# Patient Record
Sex: MALE | Race: AMERICAN INDIAN OR ALASKA NATIVE
[De-identification: names, ages, dates, MRNs, and addresses within clinical notes are randomized per-mention and may not be internally consistent; named-entity substitution may affect disease eponyms.]

---

## 2019-03-28 ENCOUNTER — HOSPITAL ENCOUNTER (EMERGENCY)
Dept: HOSPITAL 14 - H.ER | Age: 44
Discharge: HOME | End: 2019-03-28
Payer: COMMERCIAL

## 2019-03-28 VITALS
SYSTOLIC BLOOD PRESSURE: 147 MMHG | HEART RATE: 96 BPM | DIASTOLIC BLOOD PRESSURE: 82 MMHG | TEMPERATURE: 98.3 F | RESPIRATION RATE: 16 BRPM | OXYGEN SATURATION: 98 %
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
## 2019-03-28 DIAGNOSIS — S61.213A: Primary | ICD-10-CM
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
## 2019-03-28 NOTE — ED PDOC
Upper Extremity Pain/Injury


Time Seen by Provider: 03/28/19 14:30


Chief Complaint (Nursing): Finger,Hand,&Wrist


Chief Complaint (Provider): Finger Laceration


History Per: Patient


History/Exam Limitations: no limitations


Onset/Duration Of Symptoms: Mins (x30 mins pta)


Current Symptoms Are (Timing): Better


Additional Complaint(s): 


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

of a left hand injury. Patient reports that about thirty minutes prior to 

arrival while cleaning, he cut his left middle finger on a piece of metal, and 

sent for evaluation. Prior to arriving, he states he cleaned the wound and 

controlled bleeding. Denies numbness, tingling, other injury, decreased ROM, 

decreased sensation, and anti-coagulant use.





Right hand dominant 


Tetanus not up to date


PMD: none provided





Past Medical History


Reviewed: Historical Data, Nursing Documentation, Vital Signs


Vital Signs: 





                                Last Vital Signs











Temp  98.3 F   03/28/19 14:17


 


Pulse  96 H  03/28/19 14:17


 


Resp  16   03/28/19 14:17


 


BP  147/82   03/28/19 14:17


 


Pulse Ox  98   03/28/19 14:17














- Medical History


PMH: No Chronic Diseases





- Surgical History


Surgical History: No Surg Hx





- Family History


Family History: States: Unknown Family Hx





- Social History


Current smoker - smoking cessation education provided: No


Alcohol: None


Drugs: Denies





- Allergies


Allergies/Adverse Reactions: 


                                    Allergies











Allergy/AdvReac Type Severity Reaction Status Date / Time


 


No Known Allergies Allergy   Verified 03/28/19 14:17














Review of Systems


ROS Statement: Except As Marked, All Systems Reviewed And Found Negative


Skin: Positive for: Other (laceration to left middle finger)


Neurological: Negative for: Numbness (or tingling of left hand or fingers)





Physical Exam





- Reviewed


Nursing Documentation Reviewed: Yes


Vital Signs Reviewed: Yes





- Physical Exam


Comments: 


GENERAL APPEARANCE: Patient is awake, alert, oriented x 3, in no acute distress.




SKIN:  Warm, dry; (-) cyanosis.


CHEST AND RESPIRATORY: (-) chest wall tenderness. Lungs: (-) rales, (-) rhonchi,

(-) wheezes; breath sounds equal bilaterally.


HEART AND CARDIOVASCULAR: (-) irregularity; (-) murmur, (-) gallop.


LEFT HAND: 2+ radial pulse, capillary refill less than 2 seconds. (+) 1cm very 

superficial linear laceration to distal palmar aspect of left third digit, (-) 

active bleeding. Sensation intact. (+) full ROM actively of all digits of left 

hand.


NEURO AND PSYCH: Mental status as above.





- ECG


O2 Sat by Pulse Oximetry: 98 (RA)


Pulse Ox Interpretation: Normal





Medical Decision Making


Medical Decision Making: 


Time: 1434


Initial Impression: wound care


Initial Plan:


--Patient's wound does not indicate suture repair. Wound covered with bacitracin

and band-aid. Return parameters discussed and patient verbalized understanding.


--Tetanus booster


Discussed diagnosis, treatment, wound care, return precautions and f/u with pt 

who is understanding, in agreement and stable for dc





 

--------------------------------------------------------------------------------


-----------------


Scribe Attestation:


Documented by Christa Christian, acting as a scribe for Manoj Flower PA-C.


Provider Scribe Attestation:


All medical record entries made by the Scribe were at my direction and 

personally dictated by me. I have reviewed the chart and agree that the record 

accurately reflects my personal performance of the history, physical exam, 

medical decision making, and the department course for this patient. I have also

personally directed, reviewed, and agree with the discharge instructions and 

disposition.





Disposition





- Clinical Impression


Clinical Impression: 


 Cut of finger








- Patient ED Disposition


Is Patient to be Admitted: No


Counseled Patient/Family Regarding: Studies Performed, Diagnosis, Need For 

Followup





- Disposition


Referrals: 


workers, comp [Other]


Disposition: Routine/Home


Disposition Time: 15:10


Condition: STABLE


Additional Instructions: 


Return to ED for new or worsening symptoms, fever >100.4, increase redness or 

swelling, foul odor or drainage from wound. Follow up with workers comp/employee

 health. Keep wound clean, dry and covered. Apply neosporin/bacitracin 1-2 times

 a day. 


Instructions:  Wound Care (DC), Common Finger Injuries (DC)


Forms:  [facility] (English), [facility] ED School/Work Excuse


Print Language: ENGLISH





- POA


Present On Arrival: None